# Patient Record
Sex: MALE | Race: WHITE | NOT HISPANIC OR LATINO
[De-identification: names, ages, dates, MRNs, and addresses within clinical notes are randomized per-mention and may not be internally consistent; named-entity substitution may affect disease eponyms.]

---

## 2018-07-11 ENCOUNTER — RX ONLY (RX ONLY)
Age: 38
End: 2018-07-11

## 2018-07-11 ENCOUNTER — OPTICAL OFFICE (OUTPATIENT)
Dept: URBAN - NONMETROPOLITAN AREA CLINIC 4 | Facility: CLINIC | Age: 38
Setting detail: OPHTHALMOLOGY
End: 2018-07-11
Payer: COMMERCIAL

## 2018-07-11 ENCOUNTER — DOCTOR'S OFFICE (OUTPATIENT)
Dept: URBAN - NONMETROPOLITAN AREA CLINIC 1 | Facility: CLINIC | Age: 38
Setting detail: OPHTHALMOLOGY
End: 2018-07-11
Payer: COMMERCIAL

## 2018-07-11 DIAGNOSIS — H52.13: ICD-10-CM

## 2018-07-11 DIAGNOSIS — H52.223: ICD-10-CM

## 2018-07-11 PROCEDURE — V2020 VISION SVCS FRAMES PURCHASES: HCPCS | Performed by: OPTOMETRIST

## 2018-07-11 PROCEDURE — 92014 COMPRE OPH EXAM EST PT 1/>: CPT | Performed by: OPTOMETRIST

## 2018-07-11 PROCEDURE — V2103 SPHEROCYLINDR 4.00D/12-2.00D: HCPCS | Performed by: OPTOMETRIST

## 2018-07-11 PROCEDURE — 92310 CONTACT LENS FITTING OU: CPT | Performed by: OPTOMETRIST

## 2018-07-11 ASSESSMENT — REFRACTION_OUTSIDERX
OS_AXIS: 095
OD_VA2: 20/20-2
OD_AXIS: 075
OS_SPHERE: -2.75
OD_CYLINDER: -0.75
OU_VA: 20/
OS_VA3: 20/
OS_VA2: 20/20-2
OS_CYLINDER: -0.75
OD_VA1: 20/20-2
OS_VA1: 20/20-2
OD_SPHERE: -2.50
OD_VA3: 20/

## 2018-07-11 ASSESSMENT — REFRACTION_AUTOREFRACTION
OS_SPHERE: -2.75
OD_CYLINDER: -0.75
OS_AXIS: 097
OD_SPHERE: -2.50
OS_CYLINDER: -0.75
OD_AXIS: 073

## 2018-07-11 ASSESSMENT — REFRACTION_MANIFEST
OD_VA1: 20/
OU_VA: 20/
OS_VA3: 20/
OS_VA2: 20/
OD_VA2: 20/
OS_VA3: 20/
OD_VA3: 20/
OD_VA1: 20/
OU_VA: 20/
OS_VA1: 20/
OS_VA2: 20/
OD_VA3: 20/
OD_VA2: 20/
OS_VA1: 20/

## 2018-07-11 ASSESSMENT — REFRACTION_CURRENTRX
OS_OVR_VA: 20/
OD_OVR_VA: 20/
OS_OVR_VA: 20/
OS_OVR_VA: 20/
OD_OVR_VA: 20/
OD_OVR_VA: 20/

## 2018-07-11 ASSESSMENT — CONFRONTATIONAL VISUAL FIELD TEST (CVF)
OD_FINDINGS: FULL
OS_FINDINGS: FULL

## 2018-07-11 ASSESSMENT — VISUAL ACUITY
OS_BCVA: 20/20
OD_BCVA: 20/20

## 2018-07-11 ASSESSMENT — SPHEQUIV_DERIVED
OD_SPHEQUIV: -2.875
OS_SPHEQUIV: -3.125

## 2022-05-06 ENCOUNTER — APPOINTMENT (EMERGENCY)
Dept: RADIOLOGY | Facility: HOSPITAL | Age: 42
End: 2022-05-06
Payer: COMMERCIAL

## 2022-05-06 ENCOUNTER — HOSPITAL ENCOUNTER (EMERGENCY)
Facility: HOSPITAL | Age: 42
Discharge: HOME | End: 2022-05-07
Attending: EMERGENCY MEDICINE
Payer: COMMERCIAL

## 2022-05-06 DIAGNOSIS — R07.89 ATYPICAL CHEST PAIN: Primary | ICD-10-CM

## 2022-05-06 LAB
ALBUMIN SERPL BCP-MCNC: 4.2 G/DL (ref 3.5–5)
ALP SERPL-CCNC: 108 U/L (ref 46–116)
ALT SERPL W P-5'-P-CCNC: 68 U/L (ref 12–78)
ANION GAP SERPL CALCULATED.3IONS-SCNC: 11 MMOL/L (ref 4–13)
APTT PPP: 29 SECONDS (ref 23–37)
AST SERPL W P-5'-P-CCNC: 29 U/L (ref 5–45)
BASOPHILS # BLD AUTO: 0.03 THOUSANDS/ΜL (ref 0–0.1)
BASOPHILS NFR BLD AUTO: 0 % (ref 0–1)
BILIRUB SERPL-MCNC: 0.44 MG/DL (ref 0.2–1)
BUN SERPL-MCNC: 16 MG/DL (ref 5–25)
CALCIUM SERPL-MCNC: 8.9 MG/DL (ref 8.3–10.1)
CARDIAC TROPONIN I PNL SERPL HS: <2 NG/L
CHLORIDE SERPL-SCNC: 102 MMOL/L (ref 100–108)
CO2 SERPL-SCNC: 26 MMOL/L (ref 21–32)
CREAT SERPL-MCNC: 0.94 MG/DL (ref 0.6–1.3)
D DIMER PPP FEU-MCNC: 0.31 UG/ML FEU
EOSINOPHIL # BLD AUTO: 0.15 THOUSAND/ΜL (ref 0–0.61)
EOSINOPHIL NFR BLD AUTO: 2 % (ref 0–6)
ERYTHROCYTE [DISTWIDTH] IN BLOOD BY AUTOMATED COUNT: 13.2 % (ref 11.6–15.1)
GFR SERPL CREATININE-BSD FRML MDRD: 101 ML/MIN/1.73SQ M
GLUCOSE SERPL-MCNC: 220 MG/DL (ref 65–140)
HCT VFR BLD AUTO: 47 % (ref 36.5–49.3)
HGB BLD-MCNC: 16.6 G/DL (ref 12–17)
IMM GRANULOCYTES # BLD AUTO: 0.07 THOUSAND/UL (ref 0–0.2)
IMM GRANULOCYTES NFR BLD AUTO: 1 % (ref 0–2)
INR PPP: 0.9 (ref 0.84–1.19)
LACTATE SERPL-SCNC: 1.2 MMOL/L (ref 0.5–2)
LIPASE SERPL-CCNC: 115 U/L (ref 73–393)
LYMPHOCYTES # BLD AUTO: 3.38 THOUSANDS/ΜL (ref 0.6–4.47)
LYMPHOCYTES NFR BLD AUTO: 33 % (ref 14–44)
MCH RBC QN AUTO: 30.2 PG (ref 26.8–34.3)
MCHC RBC AUTO-ENTMCNC: 35.3 G/DL (ref 31.4–37.4)
MCV RBC AUTO: 86 FL (ref 82–98)
MONOCYTES # BLD AUTO: 0.55 THOUSAND/ΜL (ref 0.17–1.22)
MONOCYTES NFR BLD AUTO: 5 % (ref 4–12)
NEUTROPHILS # BLD AUTO: 5.99 THOUSANDS/ΜL (ref 1.85–7.62)
NEUTS SEG NFR BLD AUTO: 59 % (ref 43–75)
NRBC BLD AUTO-RTO: 0 /100 WBCS
PLATELET # BLD AUTO: 436 THOUSANDS/UL (ref 149–390)
PMV BLD AUTO: 8.6 FL (ref 8.9–12.7)
POTASSIUM SERPL-SCNC: 3.4 MMOL/L (ref 3.5–5.3)
PROT SERPL-MCNC: 7.3 G/DL (ref 6.4–8.2)
PROTHROMBIN TIME: 12.1 SECONDS (ref 11.6–14.5)
RBC # BLD AUTO: 5.5 MILLION/UL (ref 3.88–5.62)
SODIUM SERPL-SCNC: 139 MMOL/L (ref 136–145)
WBC # BLD AUTO: 10.17 THOUSAND/UL (ref 4.31–10.16)

## 2022-05-06 PROCEDURE — 83690 ASSAY OF LIPASE: CPT | Performed by: EMERGENCY MEDICINE

## 2022-05-06 PROCEDURE — 99285 EMERGENCY DEPT VISIT HI MDM: CPT

## 2022-05-06 PROCEDURE — 85730 THROMBOPLASTIN TIME PARTIAL: CPT | Performed by: EMERGENCY MEDICINE

## 2022-05-06 PROCEDURE — 93005 ELECTROCARDIOGRAM TRACING: CPT

## 2022-05-06 PROCEDURE — 83605 ASSAY OF LACTIC ACID: CPT | Performed by: EMERGENCY MEDICINE

## 2022-05-06 PROCEDURE — 36415 COLL VENOUS BLD VENIPUNCTURE: CPT | Performed by: EMERGENCY MEDICINE

## 2022-05-06 PROCEDURE — 85610 PROTHROMBIN TIME: CPT | Performed by: EMERGENCY MEDICINE

## 2022-05-06 PROCEDURE — 71045 X-RAY EXAM CHEST 1 VIEW: CPT

## 2022-05-06 PROCEDURE — 99285 EMERGENCY DEPT VISIT HI MDM: CPT | Performed by: EMERGENCY MEDICINE

## 2022-05-06 PROCEDURE — 80053 COMPREHEN METABOLIC PANEL: CPT | Performed by: EMERGENCY MEDICINE

## 2022-05-06 PROCEDURE — 84484 ASSAY OF TROPONIN QUANT: CPT | Performed by: EMERGENCY MEDICINE

## 2022-05-06 PROCEDURE — 85379 FIBRIN DEGRADATION QUANT: CPT | Performed by: EMERGENCY MEDICINE

## 2022-05-06 PROCEDURE — 85025 COMPLETE CBC W/AUTO DIFF WBC: CPT | Performed by: EMERGENCY MEDICINE

## 2022-05-07 VITALS
TEMPERATURE: 97.9 F | SYSTOLIC BLOOD PRESSURE: 125 MMHG | HEART RATE: 100 BPM | WEIGHT: 227.51 LBS | RESPIRATION RATE: 20 BRPM | DIASTOLIC BLOOD PRESSURE: 65 MMHG | OXYGEN SATURATION: 93 %

## 2022-05-07 LAB
2HR DELTA HS TROPONIN: >0 NG/L
CARDIAC TROPONIN I PNL SERPL HS: 2 NG/L

## 2022-05-07 PROCEDURE — 84484 ASSAY OF TROPONIN QUANT: CPT | Performed by: EMERGENCY MEDICINE

## 2022-05-07 PROCEDURE — 93005 ELECTROCARDIOGRAM TRACING: CPT

## 2022-05-07 PROCEDURE — 36415 COLL VENOUS BLD VENIPUNCTURE: CPT | Performed by: EMERGENCY MEDICINE

## 2022-05-07 NOTE — ED PROVIDER NOTES
History  Chief Complaint   Patient presents with    Chest Pain     pt began with CP around 2215 while watching tv  also states he had pain in his left arm, heart palpitations and lightheadedness  per pt, this has been happening intermittently for months  took 324 ASA at home prior to arrival     Patient approximately 1 hour ago was lying in around when he developed pain in his left arm with dull pain on the left chest   Felt his heart racing  Got lightheaded and dizzy  Slightly diaphoretic  Symptoms lasted approximately 10 minutes and resolved  History of same in the past but has not been seen by anyone for it  Past history of alcohol use  Does smoke cigars  No history of diabetes or hypertension  Has not seen a family doctor in some time  Family history of heart disease  History provided by:  Patient   used: No    Chest Pain  Pain location:  L chest  Pain quality: aching    Pain radiates to:  L arm  Pain radiates to the back: no    Pain severity:  Mild  Onset quality:  Sudden  Duration:  10 minutes  Timing:  Constant  Progression:  Resolved  Context: at rest    Relieved by:  Nothing  Worsened by:  Nothing tried  Associated symptoms: dizziness and nausea    Associated symptoms: no abdominal pain, no AICD problem, no anorexia, no back pain, no cough, no dysphagia, no fever, no headache, no orthopnea, no palpitations, no PND, no shortness of breath and not vomiting    Risk factors: male sex, obesity and smoking        None       History reviewed  No pertinent past medical history  History reviewed  No pertinent surgical history  History reviewed  No pertinent family history  I have reviewed and agree with the history as documented      E-Cigarette/Vaping    E-Cigarette Use Never User      E-Cigarette/Vaping Substances     Social History     Tobacco Use    Smoking status: Light Tobacco Smoker     Types: Cigars    Smokeless tobacco: Never Used   Vaping Use    Vaping Use: Never used   Substance Use Topics    Alcohol use: Not Currently    Drug use: Not Currently       Review of Systems   Constitutional: Negative for chills and fever  HENT: Negative for ear pain, hearing loss, sore throat, trouble swallowing and voice change  Eyes: Negative for pain and discharge  Respiratory: Negative for cough, shortness of breath and wheezing  Cardiovascular: Positive for chest pain  Negative for palpitations, orthopnea and PND  Gastrointestinal: Positive for nausea  Negative for abdominal pain, anorexia, blood in stool, constipation, diarrhea and vomiting  Genitourinary: Negative for dysuria, flank pain, frequency and hematuria  Musculoskeletal: Negative for back pain, joint swelling, neck pain and neck stiffness  Skin: Negative for rash and wound  Neurological: Positive for dizziness  Negative for seizures, syncope, facial asymmetry and headaches  Psychiatric/Behavioral: Negative for hallucinations, self-injury and suicidal ideas  All other systems reviewed and are negative  Physical Exam  Physical Exam  Vitals and nursing note reviewed  Constitutional:       General: He is not in acute distress  Appearance: He is well-developed  HENT:      Head: Normocephalic and atraumatic  Right Ear: External ear normal       Left Ear: External ear normal    Eyes:      General: No scleral icterus  Right eye: No discharge  Left eye: No discharge  Extraocular Movements: Extraocular movements intact  Conjunctiva/sclera: Conjunctivae normal    Cardiovascular:      Rate and Rhythm: Normal rate and regular rhythm  Heart sounds: Normal heart sounds  No murmur heard  Pulmonary:      Effort: Pulmonary effort is normal       Breath sounds: Normal breath sounds  No wheezing or rales  Abdominal:      General: Bowel sounds are normal  There is no distension  Palpations: Abdomen is soft  Tenderness: There is no abdominal tenderness  There is no guarding or rebound  Musculoskeletal:         General: No deformity  Normal range of motion  Cervical back: Normal range of motion and neck supple  Skin:     General: Skin is warm and dry  Findings: No rash  Neurological:      General: No focal deficit present  Mental Status: He is alert and oriented to person, place, and time  Cranial Nerves: No cranial nerve deficit  Psychiatric:         Mood and Affect: Mood normal          Behavior: Behavior normal          Thought Content: Thought content normal          Judgment: Judgment normal          Vital Signs  ED Triage Vitals [05/06/22 2320]   Temperature Pulse Respirations Blood Pressure SpO2   97 9 °F (36 6 °C) (!) 113 18 147/98 97 %      Temp Source Heart Rate Source Patient Position - Orthostatic VS BP Location FiO2 (%)   Temporal Monitor Lying Right arm --      Pain Score       --           Vitals:    05/06/22 2320   BP: 147/98   Pulse: (!) 113   Patient Position - Orthostatic VS: Lying         Visual Acuity      ED Medications  Medications - No data to display    Diagnostic Studies  Results Reviewed     Procedure Component Value Units Date/Time    HS Troponin I 2hr [[id number]]  (Normal) Collected: 05/07/22 0100    Lab Status: Final result Specimen: Blood from Arm, Left Updated: 05/07/22 0131     hs TnI 2hr 2 ng/L      Delta 2hr hsTnI >0 ng/L     HS Troponin I 4hr [[id number]]     Lab Status: No result Specimen: Blood     HS Troponin 0hr (reflex protocol) [[id number]]  (Normal) Collected: 05/06/22 2325    Lab Status: Final result Specimen: Blood from Arm, Right Updated: 05/06/22 2359     hs TnI 0hr <2 ng/L     Lactic acid [[id number]]  (Normal) Collected: 05/06/22 2325    Lab Status: Final result Specimen: Blood from Arm, Right Updated: 05/06/22 2355     LACTIC ACID 1 2 mmol/L     Narrative:      Result may be elevated if tourniquet was used during collection      Comprehensive metabolic panel [[id number]]  (Abnormal) Collected: 05/06/22 2325    Lab Status: Final result Specimen: Blood from Arm, Right Updated: 05/06/22 2354     Sodium 139 mmol/L      Potassium 3 4 mmol/L      Chloride 102 mmol/L      CO2 26 mmol/L      ANION GAP 11 mmol/L      BUN 16 mg/dL      Creatinine 0 94 mg/dL      Glucose 220 mg/dL      Calcium 8 9 mg/dL      AST 29 U/L      ALT 68 U/L      Alkaline Phosphatase 108 U/L      Total Protein 7 3 g/dL      Albumin 4 2 g/dL      Total Bilirubin 0 44 mg/dL      eGFR 101 ml/min/1 73sq m     Narrative:      [place] guidelines for Chronic Kidney Disease (CKD):     Stage 1 with normal or high GFR (GFR > 90 mL/min/1 73 square meters)    Stage 2 Mild CKD (GFR = 60-89 mL/min/1 73 square meters)    Stage 3A Moderate CKD (GFR = 45-59 mL/min/1 73 square meters)    Stage 3B Moderate CKD (GFR = 30-44 mL/min/1 73 square meters)    Stage 4 Severe CKD (GFR = 15-29 mL/min/1 73 square meters)    Stage 5 End Stage CKD (GFR <15 mL/min/1 73 square meters)  Note: GFR calculation is accurate only with a steady state creatinine    Lipase [[id number]]  (Normal) Collected: 05/06/22 2325    Lab Status: Final result Specimen: Blood from Arm, Right Updated: 05/06/22 2354     Lipase 115 u/L     Protime-INR [[id number]]  (Normal) Collected: 05/06/22 2325    Lab Status: Final result Specimen: Blood from Arm, Right Updated: 05/06/22 2348     Protime 12 1 seconds      INR 0 90    APTT [[id number]]  (Normal) Collected: 05/06/22 2325    Lab Status: Final result Specimen: Blood from Arm, Right Updated: 05/06/22 2348     PTT 29 seconds     D-Dimer [[id number]]  (Normal) Collected: 05/06/22 2325    Lab Status: Final result Specimen: Blood from Arm, Right Updated: 05/06/22 2348     D-Dimer, Quant 0 31 ug/ml FEU     CBC and differential [[id number]]  (Abnormal) Collected: 05/06/22 2325    Lab Status: Final result Specimen: Blood from Arm, Right Updated: 05/06/22 2334     WBC 10 17 Thousand/uL      RBC 5 50 Million/uL      Hemoglobin 16 6 g/dL      Hematocrit 47 0 %      MCV 86 fL      MCH 30 2 pg      MCHC 35 3 g/dL      RDW 13 2 %      MPV 8 6 fL      Platelets 446 Thousands/uL      nRBC 0 /100 WBCs      Neutrophils Relative 59 %      Immat GRANS % 1 %      Lymphocytes Relative 33 %      Monocytes Relative 5 %      Eosinophils Relative 2 %      Basophils Relative 0 %      Neutrophils Absolute 5 99 Thousands/µL      Immature Grans Absolute 0 07 Thousand/uL      Lymphocytes Absolute 3 38 Thousands/µL      Monocytes Absolute 0 55 Thousand/µL      Eosinophils Absolute 0 15 Thousand/µL      Basophils Absolute 0 03 Thousands/µL                  XR chest 1 view portable   ED Interpretation by Glo Dickens MD (05/06 2341)   NAD                 Procedures  ECG 12 Lead Documentation Only    Date/Time: 5/6/2022 11:24 PM  Performed by: Glo Dickens MD  Authorized by: Glo Dickens MD     ECG reviewed by me, the ED Provider: yes    Patient location:  ED  Previous ECG:     Previous ECG:  Unavailable  Interpretation:     Interpretation: non-specific    Rate:     ECG rate:  110  Rhythm:     Rhythm: sinus rhythm    Ectopy:     Ectopy: none    QRS:     QRS axis:  Normal             ED Course  ED Course as of 05/07/22 0134   Sat May 07, 2022   0102 EKG 2  Shows a normal sinus rhythm with a rate 100  There is no change from previous EKG  There are no acute ST changes  0132 Heart score 3  Second EKG was unchanged  Second troponin was 2  Patient stable for discharge               HEART Risk Score      Most Recent Value   Heart Score Risk Calculator    History 1 Filed at: 05/06/2022 2325   ECG 1 Filed at: 05/06/2022 2325   Age 0 Filed at: 05/06/2022 2325   Risk Factors 1 Filed at: 05/06/2022 2325   Troponin 0 Filed at: 05/06/2022 2325   HEART Score 3 Filed at: 05/06/2022 2325                        SBIRT 22yo+      Most Recent Value   SBIRT (23 yo +)    In order to provide better care to our patients, we are screening all of our patients for alcohol and drug use  Would it be okay to ask you these screening questions? No Filed at: 05/06/2022 2331                    MDM  Number of Diagnoses or Management Options     Amount and/or Complexity of Data Reviewed  Clinical lab tests: reviewed  Review and summarize past medical records: yes        Disposition  Final diagnoses:   Atypical chest pain     Time reflects when diagnosis was documented in both MDM as applicable and the Disposition within this note     Time User Action Codes Description Comment    5/7/2022  1:01 AM Anne Bagley Add [R07 89] Atypical chest pain       ED Disposition     ED Disposition Condition Date/Time Comment    Discharge Stable Sat May 7, 2022  1:01 AM Jatinder Bonillaimahannah discharge to home/self care  Follow-up Information     Follow up With Specialties Details Why Contact Info    Candy Leyva MD Family Medicine Call in 3 days  [address] [facility]      Leatha Phoenix, DO Cardiology Call in 3 days  [place]  [address]  [phone]            Patient's Medications    No medications on file       No discharge procedures on file      PDMP Review     None          ED Provider  Electronically Signed by           Indira Reed MD  05/07/22 5598

## 2022-05-08 LAB
ATRIAL RATE: 106 BPM
ATRIAL RATE: 115 BPM
P AXIS: 64 DEGREES
P AXIS: 73 DEGREES
PR INTERVAL: 154 MS
PR INTERVAL: 158 MS
QRS AXIS: 27 DEGREES
QRS AXIS: 30 DEGREES
QRSD INTERVAL: 94 MS
QRSD INTERVAL: 94 MS
QT INTERVAL: 342 MS
QT INTERVAL: 346 MS
QTC INTERVAL: 454 MS
QTC INTERVAL: 478 MS
T WAVE AXIS: 1 DEGREES
T WAVE AXIS: 27 DEGREES
VENTRICULAR RATE: 106 BPM
VENTRICULAR RATE: 115 BPM

## 2022-05-16 ENCOUNTER — OFFICE VISIT (OUTPATIENT)
Dept: CARDIOLOGY CLINIC | Facility: CLINIC | Age: 42
End: 2022-05-16

## 2022-05-16 VITALS
OXYGEN SATURATION: 97 % | TEMPERATURE: 98.1 F | WEIGHT: 221 LBS | HEIGHT: 64 IN | BODY MASS INDEX: 37.73 KG/M2 | HEART RATE: 91 BPM | SYSTOLIC BLOOD PRESSURE: 114 MMHG | DIASTOLIC BLOOD PRESSURE: 80 MMHG

## 2022-05-16 DIAGNOSIS — R00.2 PALPITATIONS: ICD-10-CM

## 2022-05-16 DIAGNOSIS — E66.09 CLASS 2 OBESITY DUE TO EXCESS CALORIES WITHOUT SERIOUS COMORBIDITY WITH BODY MASS INDEX (BMI) OF 38.0 TO 38.9 IN ADULT: Primary | ICD-10-CM

## 2022-05-16 PROBLEM — R73.09 ELEVATED GLUCOSE LEVEL: Status: ACTIVE | Noted: 2022-05-16

## 2022-05-16 PROCEDURE — 99244 OFF/OP CNSLTJ NEW/EST MOD 40: CPT | Performed by: INTERNAL MEDICINE

## 2022-05-16 NOTE — PROGRESS NOTES
Brissaedward George  1980  [id number]  [facility]  [address]  [facility] [address]  [phone]    1  Class 2 obesity due to excess calories without serious comorbidity with body mass index (BMI) of 38 0 to 38 9 in adult  -     Echo complete w/ contrast if indicated; Future; Expected date: 05/16/2022         ASSESSMENT/PLAN  My impression is a gentleman who is obese likely has sleep apnea and that sound like he had a couple of brief episodes of SVT which were symptomatic but self limited  Also recently noted to have a glucose of 220 and may have early diabetes  I reviewed with Dariela Cerna that all the medical problems that were talking about can be caused by gaining weight and improve significantly with losing weight  He has already lost 7 or 8 lb by eating less and more healthy in the past few weeks  I strongly recommend a low carb low-calorie diet with a goal toward weight loss  I told him in he should let us know if he has more episodes of heart racing  Because the episodes have been few and far between I do not think there be any benefit to it with having more heart monitor for short period of time  I will check an echocardiogram given his compelling family history of hypertrophic cardiomyopathy and given his heart racing episodes that is concerning  Otherwise he does not have any exertional cardiac symptoms and I do not think stress testing is necessary at this time  Strongly recommend complete tobacco cessation as well as follow-up with his primary physician regarding potential diabetes as well as treatment of his anxiety and depression  I did review the regular exercise and walking is helpful for treatment of anxiety and depression as well  I told him I did not think anxiety caused his episodes of heart racing however        HPI  24-year-old gentleman history of depression previous alcohol abuse none in 10 years new patient appointment for heart racing  He does smoke cigars and is overweight and has had 2 episodes of heart racing acute onset lasting 5-10 minutes in the last few months  The 2nd episode he was more diaphoretic and had chest discomfort and dizziness and so called the 911  By the time they got there his heart had stopped racing  Cardiac enzymes were subsequently negative  These the only episodes like this he has ever had  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 shortness of breath or heart racing  No orthopnea or PND  He does have little bit of ankle swelling at the end of the day but he is on his feet all day long  He has never had a heart attack or stroke or heart failure  His brother is with him today with a diagnosis of hypertrophic cardiomyopathy  Apparently an uncle is also been diagnosed with hypertrophic cardiomyopathy  Kathya Hoffman has never been screened for same  Kathya Hoffman denies syncope or near syncope  He is otherwise not seen a physician in for 5 years when he lost his health insurance  He has not been on Lexapro for a few years which he took for depression he is currently on no medications  D-dimer and chest x-ray were unremarkable last week       Medical Problems             Problem List     Major depressive disorder, recurrent episode, moderate (HCC)    Alcohol dependence in remission [facility])    Advance directive discussed with patient    Overview Signed 5/16/2022  1:28 PM by Latrice Henley MD     Formatting of this note might be different from the original   No, Advance Directive brochure given to patient                       Past Medical History:   Diagnosis Date    Alcohol dependence in remission ([facility] [address] )     Major depressive disorder     Seizures (HCC)     history of seizures as a child     Social History     Socioeconomic History    Marital status: Single     Spouse name: Not on file    Number of children: Not on file    Years of education: Not on file    Highest education level: Not on file   Occupational History    Not on file   Tobacco Use    Smoking status: Light Tobacco Smoker     Types: Cigars    Smokeless tobacco: Never Used   Vaping Use    Vaping Use: Never used   Substance and Sexual Activity    Alcohol use: Not Currently     Comment: recovering alcoholic  10 1/2 years    Drug use: Not Currently    Sexual activity: Not on file   Other Topics Concern    Not on file   Social History Narrative    Not on file     Social Determinants of Health     Financial Resource Strain: Not on file   Food Insecurity: Not on file   Transportation Needs: Not on file   Physical Activity: Not on file   Stress: Not on file   Social Connections: Not on file   Intimate Partner Violence: Not on file   Housing Stability: Not on file      Family History   Problem Relation Age of Onset    Heart attack Mother     Polycythemia Mother     Hypertrophic cardiomyopathy Brother     Heart disease Maternal Uncle      History reviewed  No pertinent surgical history  No current outpatient medications on file  Allergies   Allergen Reactions    Aspirin Other (See Comments)     Patient reports that he is NOT allergic to ASA       Labs:     Chemistry        Component Value Date/Time    K 3 4 (L) 05/06/2022 2325     05/06/2022 2325    CO2 26 05/06/2022 2325    BUN 16 05/06/2022 2325    CREATININE 0 94 05/06/2022 2325        Component Value Date/Time    CALCIUM 8 9 05/06/2022 2325    ALKPHOS 108 05/06/2022 2325    AST 29 05/06/2022 2325    ALT 68 05/06/2022 2325            No results found for: CHOL  No results found for: HDL  No results found for: LDLCALC  No results found for: TRIG  No results found for: CHOLHDL    Imaging: XR chest 1 view portable    Result Date: 5/7/2022  Narrative: CHEST INDICATION:   Chest pain  Patient reports chest pain began while watching TV  Left arm pain and heart palpitations, lightheadedness    Similar symptoms intermittently for several months  COMPARISON:  None EXAM PERFORMED/VIEWS:  XR CHEST PORTABLE FINDINGS:  Lung volumes are within normal limits  Lungs are clear  No effusion or pneumothorax  Heart, mediastinal and hilar structures are within normal limits  No acute osseous or soft tissue pathology  Findings agree with the ED preliminary interpretation  Impression: No acute cardiopulmonary findings  Workstation performed: [id number]       ECG:  Normal sinus rhythm  Nonspecific ST abnormality      ROS  He denies nausea vomiting diarrhea  No fevers chills or cough  He gets some musculoskeletal pain with repetitive motion as a   He denies any bleeding  He does get a little bit ankle swelling at the end of day which resolves by the following morning  No calf pain or claudication  All other review of systems negative other than outlined above  Vitals:    05/16/22 1351   BP: 114/80   Pulse: 91   Temp: 98 1 °F (36 7 °C)   SpO2: 97%     Vitals:    05/16/22 1351   Weight: 100 kg (221 lb)     Height: 5' 4" (162 6 cm)   Body mass index is 37 93 kg/m²  Physical Exam:  Vital signs reviewed  General appearance:  Appears stated age, alert, well appearing and in no distress truncal obesity is noted  He is oriented x4  HEENT:  PERRLA, EOMI, no scleral icterus, no conjunctival pallor  NECK:  Supple, No elevated JVP, no thyromegaly, no carotid bruits, no JVD  HEART:  Regular rate and rhythm, normal S1/S2, and S4 is noted no murmur or rub, PMI nondisplaced  LUNGS:  Clear to auscultation bilaterally, no wheezes rales or rhonchi  ABDOMEN:  Soft, non-tender, positive bowel sounds, no rebound or guarding, no organomegaly   EXTREMITIES:  Normal range of motion  No clubbing or cyanosis   No edema  VASCULAR:  Normal pedal pulses   SKIN: No lesions or rashes on exposed skin  NEURO:  CN II-XII intact, no focal deficits

## 2022-06-23 ENCOUNTER — HOSPITAL ENCOUNTER (OUTPATIENT)
Dept: NON INVASIVE DIAGNOSTICS | Facility: HOSPITAL | Age: 42
Discharge: HOME | End: 2022-06-23
Attending: INTERNAL MEDICINE

## 2022-06-23 VITALS
HEIGHT: 64 IN | SYSTOLIC BLOOD PRESSURE: 108 MMHG | BODY MASS INDEX: 37.64 KG/M2 | HEART RATE: 72 BPM | DIASTOLIC BLOOD PRESSURE: 66 MMHG | WEIGHT: 220.46 LBS

## 2022-06-23 DIAGNOSIS — E66.09 CLASS 2 OBESITY DUE TO EXCESS CALORIES WITHOUT SERIOUS COMORBIDITY WITH BODY MASS INDEX (BMI) OF 38.0 TO 38.9 IN ADULT: ICD-10-CM

## 2022-06-23 LAB
AORTIC ROOT: 3.5 CM
ASCENDING AORTA: 3.2 CM
E WAVE DECELERATION TIME: 204 MS
FRACTIONAL SHORTENING: 30 % (ref 28–44)
INTERVENTRICULAR SEPTUM IN DIASTOLE (PARASTERNAL SHORT AXIS VIEW): 0.8 CM
INTERVENTRICULAR SEPTUM: 0.8 CM (ref 0.6–1.1)
LAAS-AP2: 21.8 CM2
LAAS-AP4: 21.5 CM2
LEFT ATRIUM SIZE: 4.1 CM
LEFT INTERNAL DIMENSION IN SYSTOLE: 3.7 CM (ref 2.1–4)
LEFT VENTRICULAR INTERNAL DIMENSION IN DIASTOLE: 5.3 CM (ref 3.5–6)
LEFT VENTRICULAR POSTERIOR WALL IN END DIASTOLE: 0.8 CM
LEFT VENTRICULAR STROKE VOLUME: 78 ML
LVSV (TEICH): 78 ML
MV E'TISSUE VEL-LAT: 18 CM/S
MV E'TISSUE VEL-SEP: 9 CM/S
MV PEAK A VEL: 0.62 M/S
MV PEAK E VEL: 77 CM/S
MV STENOSIS PRESSURE HALF TIME: 59 MS
MV VALVE AREA P 1/2 METHOD: 3.73 CM2
RIGHT ATRIAL 2D VOLUME: 54 ML
RIGHT ATRIUM AREA SYSTOLE A4C: 18.9 CM2
RIGHT VENTRICLE ID DIMENSION: 3.1 CM
SL CV LEFT ATRIUM LENGTH A2C: 6 CM
SL CV LV EF: 60
SL CV PED ECHO LEFT VENTRICLE DIASTOLIC VOLUME (MOD BIPLANE) 2D: 138 ML
SL CV PED ECHO LEFT VENTRICLE SYSTOLIC VOLUME (MOD BIPLANE) 2D: 59 ML
TR MAX PG: 4 MMHG
TR PEAK VELOCITY: 1 M/S
TRICUSPID VALVE PEAK E WAVE VELOCITY: 0.16 M/S
TRICUSPID VALVE PEAK REGURGITATION VELOCITY: 1 M/S

## 2022-06-23 PROCEDURE — 93306 TTE W/DOPPLER COMPLETE: CPT

## 2022-06-23 PROCEDURE — 93306 TTE W/DOPPLER COMPLETE: CPT | Performed by: STUDENT IN AN ORGANIZED HEALTH CARE EDUCATION/TRAINING PROGRAM

## 2022-06-24 ENCOUNTER — TELEPHONE (OUTPATIENT)
Dept: CARDIOLOGY CLINIC | Facility: CLINIC | Age: 42
End: 2022-06-24

## 2022-06-24 NOTE — TELEPHONE ENCOUNTER
----- Message from Sarthak Pinon sent at 6/24/2022 12:16 PM EDT -----  Can you please let patient know that the echocardiogram Dr Rm Mtz ordered shows normal heart function with no concerning findings? Thank you!